# Patient Record
Sex: FEMALE | Race: BLACK OR AFRICAN AMERICAN | Employment: FULL TIME | URBAN - METROPOLITAN AREA
[De-identification: names, ages, dates, MRNs, and addresses within clinical notes are randomized per-mention and may not be internally consistent; named-entity substitution may affect disease eponyms.]

---

## 2017-12-12 ENCOUNTER — OFFICE VISIT (OUTPATIENT)
Dept: FAMILY MEDICINE | Facility: CLINIC | Age: 62
End: 2017-12-12
Payer: COMMERCIAL

## 2017-12-12 VITALS
SYSTOLIC BLOOD PRESSURE: 138 MMHG | TEMPERATURE: 97.7 F | WEIGHT: 180.8 LBS | HEART RATE: 84 BPM | DIASTOLIC BLOOD PRESSURE: 76 MMHG | OXYGEN SATURATION: 99 % | BODY MASS INDEX: 31.03 KG/M2

## 2017-12-12 DIAGNOSIS — H00.011 HORDEOLUM EXTERNUM OF RIGHT UPPER EYELID: ICD-10-CM

## 2017-12-12 DIAGNOSIS — L81.9 HYPERPIGMENTED SKIN LESION: Primary | ICD-10-CM

## 2017-12-12 PROCEDURE — 99213 OFFICE O/P EST LOW 20 MIN: CPT | Performed by: PHYSICIAN ASSISTANT

## 2017-12-12 NOTE — PROGRESS NOTES
"  SUBJECTIVE:   Shanae Brand is a 62 year old female who presents to clinic today for the following health issues:      Eye(s) Problem  Onset: This past Wed.    Description:   Location: right  Pain: no  Redness: YES    Accompanying Signs & Symptoms:  Discharge/mattering: no  Swelling: YES  Visual changes: no  Fever: no  Nasal Congestion: no  Bothered by bright lights: no    History:   Trauma: YES- Patient states she put apple cider vinegar and tea tree oil on eye lid  Foreign body exposure: no    Precipitating factors:   Wearing contacts: no    Alleviating factors:  Improved by: nothing    Therapies Tried and outcome: nothing      Patient states she has skin tags on her face and neck that she would like them taken off.   Went to dermatology once, and they tried to \"burn them\" and she left.     Problem list and histories reviewed & adjusted, as indicated.  Additional history: as documented    Patient Active Problem List   Diagnosis     Allergic rhinitis     Other specified disorders of rotator cuff syndrome of shoulder and allied disorders     Nonallopathic lesion of thoracic region     Iron deficiency anemia     CARDIOVASCULAR SCREENING; LDL GOAL LESS THAN 160     Leiomyoma of uterus     Cervical cyst     Vitamin D deficiency     Fracture of toe     Pain of toe of right foot     Skin tags     Past Surgical History:   Procedure Laterality Date     DILATION AND CURETTAGE, OPERATIVE HYSTEROSCOPY, COMBINED N/A 10/9/2014    Procedure: COMBINED DILATION AND CURETTAGE, OPERATIVE HYSTEROSCOPY;  Surgeon: Paul Martinez MD;  Location: [facility] OR     HC REMOVAL OF TONSILS,<11 Y/O  age 10       Social History   Substance Use Topics     Smoking status: Never Smoker     Smokeless tobacco: Never Used     Alcohol use No     Family History   Problem Relation Age of Onset     DIABETES Father      DM2     Alcohol/Drug Father      Family History Negative Mother      Family History Negative Sister      Family History Negative Sister      Family " History Negative Brother      Family History Negative Brother      Family History Negative Son      Breast Cancer Mother      Breast CA survivor.  Dx age 68             Reviewed and updated as needed this visit by clinical staffTobacco  Allergies  Meds  Med Hx  Surg Hx  Fam Hx  Soc Hx      Reviewed and updated as needed this visit by Provider         ROS:  Constitutional, HEENT, cardiovascular, pulmonary, gi and gu systems are negative, except as otherwise noted.      OBJECTIVE:   /76 (BP Location: Right arm, Patient Position: Chair, Cuff Size: Adult Large)  Pulse 84  Temp 97.7  F (36.5  C) (Oral)  Wt 180 lb 12.8 oz (82 kg)  SpO2 99%  BMI 31.03 kg/m2  Body mass index is 31.03 kg/(m^2).  GENERAL APPEARANCE: healthy, alert and no distress  EYES: PERRL and conjunctivae and sclerae normal, upper right lid with small hordeolum  SKIN: scattered pinpoint hyperpigmented macules under both eyes and upper cheeks    Diagnostic Test Results:  none     ASSESSMENT/PLAN:             1. Hyperpigmented skin lesion  Referred to Dr. Norton per pt preference  - DERMATOLOGY REFERRAL    2. Hordeolum externum of right upper eyelid  Warm compresses. F/u as needed   - neomycin-polymixin-dexamethasone (MAXITROL) ophthalmic ointment; Apply 1/4 inch ribbon to upper eyelid BID x 7 days  Dispense: 1 Tube; Refill: 0        Yoon Corral PA-C  [facility]

## 2017-12-12 NOTE — MR AVS SNAPSHOT
"              After Visit Summary   2017    Shanae Brand    MRN: [id number]           Patient Information     Date Of Birth          1955        Visit Information        Provider Department      2017 10:00 AM Yoon Corral PA-C [facility]        Today's Diagnoses     Visit for screening mammogram        Screening for malignant neoplasm of cervix        Need for hepatitis C screening test        Need for prophylactic vaccination and inoculation against influenza           Follow-ups after your visit        Who to contact     If you have questions or need follow up information about today's clinic visit or your schedule please contact [facility] directly at [phone].  Normal or non-critical lab and imaging results will be communicated to you by [employer], letter or phone within 4 business days after the clinic has received the results. If you do not hear from us within 7 days, please contact the clinic through [employer] or phone. If you have a critical or abnormal lab result, we will notify you by phone as soon as possible.  Submit refill requests through [employer] or call your pharmacy and they will forward the refill request to us. Please allow 3 business days for your refill to be completed.          Additional Information About Your Visit        MyChart Information     [employer] lets you send messages to your doctor, view your test results, renew your prescriptions, schedule appointments and more. To sign up, go to [email or web address] . Click on \"Log in\" on the left side of the screen, which will take you to the Welcome page. Then click on \"Sign up Now\" on the right side of the page.     You will be asked to enter the access code listed below, as well as some personal information. Please follow the directions to create your username and password.     Your access code is: [email or web address]  Expires: 3/12/2018 10:11 AM     Your access code will  in 90 days. " If you need help or a new code, please call your [place] clinic or [phone].        Care EveryWhere ID     This is your Care EveryWhere ID. This could be used by other organizations to access your [place] medical records  [id number]        Your Vitals Were     Pulse Temperature Pulse Oximetry BMI (Body Mass Index)          84 97.7  F (36.5  C) (Oral) 99% 31.03 kg/m2         Blood Pressure from Last 3 Encounters:   12/12/17 138/76   02/24/15 128/74   02/19/15 120/72    Weight from Last 3 Encounters:   12/12/17 180 lb 12.8 oz (82 kg)   02/24/15 172 lb (78 kg)   02/19/15 168 lb (76.2 kg)              Today, you had the following     No orders found for display         Today's Medication Changes          These changes are accurate as of: 12/12/17 10:11 AM.  If you have any questions, ask your nurse or doctor.               Stop taking these medicines if you haven't already. Please contact your care team if you have questions.     clobetasol 0.05 % cream   Commonly known as:  TEMOVATE   Stopped by:  Yoon Corral PA-C           order for DME   Stopped by:  Yoon Corral PA-C                    Primary Care Provider Office Phone # Fax #    Yoon Corral PA-C [phone] [address] [facility] [address]        Equal Access to Services     [facility]: Hadii jairo flor hadasho Somariamali, waaxda luqadaha, qaybta kaalmada aderosyada, elaina sánchez . So [facility] [phone].    ATENCIÓN: Si habla español, tiene a barrera disposición servicios gratuitos de asistencia lingüística. Llame al [phone].    We comply with applicable federal civil rights laws and [place] laws. We do not discriminate on the basis of race, color, national origin, age, disability, sex, sexual orientation, or gender identity.            Thank you!     Thank you for choosing [facility]  for your care. Our goal is always to provide you with excellent care. Hearing back  from our patients is one way we can continue to improve our services. Please take a few minutes to complete the written survey that you may receive in the mail after your visit with us. Thank you!             Your Updated Medication List - Protect others around you: Learn how to safely use, store and throw away your medicines at www.disposemymeds.org.      Notice  As of 12/12/2017 10:11 AM    You have not been prescribed any medications.

## 2017-12-17 ENCOUNTER — HEALTH MAINTENANCE LETTER (OUTPATIENT)
Age: 62
End: 2017-12-17

## 2018-02-28 ENCOUNTER — TELEPHONE (OUTPATIENT)
Dept: FAMILY MEDICINE | Facility: CLINIC | Age: 63
End: 2018-02-28

## 2018-02-28 NOTE — TELEPHONE ENCOUNTER
Panel Management Review      Patient has the following on her problem list: None      Composite cancer screening  Chart review shows that this patient is due/due soon for the following Pap Smear and Mammogram  Summary:    Patient is due/failing the following:   MAMMOGRAM and PAP    Action needed:   Patient needs office visit for physical and pap also schedule mammogram.    Type of outreach:    Phone, left message for patient to call back.     Questions for provider review:    None                                                                                                                                    Alaina Asif [facility]       Chart routed to Care Team .

## 2018-02-28 NOTE — LETTER
[facility]  [address] [address] [address]  [phone]  March 7, 2018    Shanae Brand  [address] [address]    Dear Shanae,    I care about your health and have reviewed your health plan. I have reviewed your medical conditions, medication list, and lab results and am making recommendations based on this review, to better manage your health.    You are in particular need of attention regarding:  -Breast Cancer Screening  -Cervical Cancer Screening    I am recommending that you:  -schedule a MAMMOGRAM which is due. We have mammogram available at our clinic; Tuesday 8:00am-11:30am or Wednesday 2:00pm-4:15pm- to schedule call [phone].   Please disregard this reminder if you have had this exam elsewhere within the last year.  It would be helpful for us to have a copy of your mammogram report in our file so that we can best coordinate your care.  -schedule a PAP SMEAR EXAM which is due.  Please disregard this reminder if you have had this exam elsewhere within the last year.  It would be helpful for us to have a copy of your recent pap smear report in our file so that we can best coordinate your care.    Here is a list of Health Maintenance topics that are due now or due soon:  Health Maintenance Due   Topic Date Due     HEPATITIS C SCREENING  02/02/1975     MAMMO SCREEN Q2 YR (SYSTEM ASSIGNED)  02/02/2007     ADVANCE DIRECTIVE PLANNING Q5 YRS  02/02/2012     PAP SCREENING Q3 YR (SYSTEM ASSIGNED)  05/02/2012   Please call us at [phone] (or use [employer]) to address the above recommendations.     Thank you for trusting [facility] and we appreciate the opportunity to serve you.  We look forward to supporting your healthcare needs in the future.    Healthy Regards,  Yoon Corral PA-C

## 2018-05-10 ENCOUNTER — TELEPHONE (OUTPATIENT)
Dept: FAMILY MEDICINE | Facility: CLINIC | Age: 63
End: 2018-05-10

## 2018-05-10 NOTE — LETTER
[facility]  [address] [place] [id number] [phone]  May 17, 2018    Shanae Brand  [address] [address]    Dear Shanae,    I care about your health and have reviewed your health plan. I have reviewed your medical conditions, medication list, and lab results and am making recommendations based on this review, to better manage your health.    You are in particular need of attention regarding:  -Colon Cancer Screening    I am recommending that you:  {recommendations:-schedule a COLONOSCOPY to look for colon cancer (due every 10 years or 5 years in higher risk situations.)   Colon cancer is now the second leading cause of death in the [place] for both men and women and there are over 130,000 new cases and 50,000 deaths per year from colon cancer.  Colonoscopies can prevent 90-95% of these deaths.  Problem lesions can be removed before they ever become cancer.  This test is not only looking for cancer, but also getting rid of precancerious lesions.  If you do not wish to do a colonoscopy or cannot afford to do one, at this time, there is another option. It is called a FIT test or Fecal Immunochemical Occult Blood Test (take home stool sample kit).  It does not replace the colonoscopy for colorectal cancer screening, but it can detect hidden bleeding in the lower colon.  It does need to be repeated every year and if a positive result is obtained, you would be referred for a colonoscopy.  If you have completed either one of these tests at another facility, please have the records sent to our clinic so that we can best coordinate your care.    Please call us at [phone] (or use [employer]) to address the above recommendations.     Thank you for trusting [facility] and we appreciate the opportunity to serve you.  We look forward to supporting your healthcare needs in the future.    Healthy Regards,    Yoon Corral PA-C

## 2018-05-10 NOTE — TELEPHONE ENCOUNTER
Panel Management Review      Patient has the following on her problem list: None      Composite cancer screening  Chart review shows that this patient is due/due soon for the following Colonoscopy and Fecal Colorectal (FIT)  Summary:    Patient is due/failing the following:   COLONOSCOPY and FIT    Action needed:   Patient needs referral/order: Colonoscopy or FIT test    Type of outreach:    Phone, left message for patient to call back.     Questions for provider review:    None                                                                                                                                    Rainer Gonzales MA       Chart routed to Care Team .

## 2018-09-19 ENCOUNTER — TELEPHONE (OUTPATIENT)
Dept: FAMILY MEDICINE | Facility: CLINIC | Age: 63
End: 2018-09-19

## 2018-09-19 NOTE — LETTER
[address] [id number] [phone]  October 2, 2018    Shanae Brand  [address] [address]    Dear Shanae,    I care about your health and have reviewed your health plan. I have reviewed your medical conditions, medication list, and lab results and am making recommendations based on this review, to better manage your health.    You are in particular need of attention regarding:  -Colon Cancer Screening    I am recommending that you:  -schedule a COLONOSCOPY to look for colon cancer (due every 10 years or 5 years in higher risk situations.)   Colon cancer is now the second leading cause of death in the [place] for both men and women and there are over 130,000 new cases and 50,000 deaths per year from colon cancer.  Colonoscopies can prevent 90-95% of these deaths.  Problem lesions can be removed before they ever become cancer.  This test is not only looking for cancer, but also getting rid of precancerious lesions.  If you do not wish to do a colonoscopy or cannot afford to do one, at this time, there is another option. It is called a FIT test or Fecal Immunochemical Occult Blood Test (take home stool sample kit).  It does not replace the colonoscopy for colorectal cancer screening, but it can detect hidden bleeding in the lower colon.  It does need to be repeated every year and if a positive result is obtained, you would be referred for a colonoscopy.  If you have completed either one of these tests at another facility, please have the records sent to our clinic so that we can best coordinate your care.        Please call us at [phone] (or use [employer]) to address the above recommendations.     Thank you for trusting [facility] and we appreciate the opportunity to serve you.  We look forward to supporting your healthcare needs in the future.    Healthy Regards,    Yoon Corral PA-C

## 2018-09-19 NOTE — TELEPHONE ENCOUNTER
Panel Management Review          Composite cancer screening  Chart review shows that this patient is due/due soon for the following Colonoscopy and Fecal Colorectal (FIT)  Summary:    Patient is due/failing the following:   COLONOSCOPY and FIT    Action needed:   Patient needs referral/order: Colonoscopy or FIT test    Type of outreach:    Panel Pool, please contact patient    Questions for provider review:    None                                                                                                                                    Rainer Gonzales MA       Chart routed to Care Team .

## 2018-11-06 ENCOUNTER — TELEPHONE (OUTPATIENT)
Dept: FAMILY MEDICINE | Facility: CLINIC | Age: 63
End: 2018-11-06

## 2018-11-06 NOTE — LETTER
[facility]  [address] [address]  [phone]  December 6, 2018    Shanae Brand  [address] [facility] [facility] [address]    Dear Shanae,    I care about your health and have reviewed your health plan. I have reviewed your medical conditions, medication list, and lab results and am making recommendations based on this review, to better manage your health.    You are in particular need of attention regarding:  -Cervical Cancer Screening    I am recommending that you:  {recommendations:-schedule a PAP SMEAR EXAM which is due.  Please disregard this reminder if you have had this exam elsewhere within the last year.  It would be helpful for us to have a copy of your recent pap smear report in our file so that we can best coordinate your care.    Here is a list of Health Maintenance topics that are due now or due soon:  Health Maintenance Due   Topic Date Due     PHQ-2 Q1 YR  02/02/1969     HIV SCREEN (SYSTEM ASSIGNED)  02/02/1975     HEPATITIS C SCREENING  02/02/1975     MAMMO SCREEN Q2 YR (SYSTEM ASSIGNED)  02/02/2007     ADVANCE DIRECTIVE PLANNING Q5 YRS  02/02/2012     PAP SCREENING Q3 YR (SYSTEM ASSIGNED)  05/02/2012     INFLUENZA VACCINE (1) 09/01/2018       Please call us at [phone] (or use [employer]) to address the above recommendations.     Thank you for trusting [facility] and we appreciate the opportunity to serve you.  We look forward to supporting your healthcare needs in the future.    Healthy Regards,    Yoon Corral PA-C/natalie

## 2018-11-06 NOTE — TELEPHONE ENCOUNTER
Panel Management Review      Patient has the following on her problem list: None      Composite cancer screening  Chart review shows that this patient is due/due soon for the following Pap Smear  Summary:    Patient is due/failing the following:   PAP    Action needed:   Patient needs office visit for PAP.    Type of outreach:    Panel Pool, please contact patient    Questions for provider review:    None                                                                                                                                    Rainer Gonzales MA       Chart routed to Care Team .

## 2018-12-28 DIAGNOSIS — M25.519 SHOULDER PAIN: Primary | ICD-10-CM

## 2018-12-28 DIAGNOSIS — M25.519 SHOULDER PAIN: ICD-10-CM

## 2018-12-28 LAB
BASOPHILS # BLD AUTO: 0 10E9/L (ref 0–0.2)
BASOPHILS NFR BLD AUTO: 0.2 %
CRP SERPL-MCNC: 28 MG/L (ref 0–8)
DIFFERENTIAL METHOD BLD: ABNORMAL
EOSINOPHIL # BLD AUTO: 0.1 10E9/L (ref 0–0.7)
EOSINOPHIL NFR BLD AUTO: 0.9 %
ERYTHROCYTE [DISTWIDTH] IN BLOOD BY AUTOMATED COUNT: 15.3 % (ref 10–15)
ERYTHROCYTE [SEDIMENTATION RATE] IN BLOOD BY WESTERGREN METHOD: 51 MM/H (ref 0–30)
HCT VFR BLD AUTO: 37 % (ref 35–47)
HGB BLD-MCNC: 11.6 G/DL (ref 11.7–15.7)
LDH SERPL L TO P-CCNC: 119 U/L (ref 81–234)
LYMPHOCYTES # BLD AUTO: 1.8 10E9/L (ref 0.8–5.3)
LYMPHOCYTES NFR BLD AUTO: 31.4 %
MCH RBC QN AUTO: 24.4 PG (ref 26.5–33)
MCHC RBC AUTO-ENTMCNC: 31.4 G/DL (ref 31.5–36.5)
MCV RBC AUTO: 78 FL (ref 78–100)
MONOCYTES # BLD AUTO: 0.2 10E9/L (ref 0–1.3)
MONOCYTES NFR BLD AUTO: 4.1 %
NEUTROPHILS # BLD AUTO: 3.6 10E9/L (ref 1.6–8.3)
NEUTROPHILS NFR BLD AUTO: 63.4 %
PLATELET # BLD AUTO: 330 10E9/L (ref 150–450)
RBC # BLD AUTO: 4.76 10E12/L (ref 3.8–5.2)
WBC # BLD AUTO: 5.6 10E9/L (ref 4–11)

## 2018-12-28 PROCEDURE — 83615 LACTATE (LD) (LDH) ENZYME: CPT | Performed by: FAMILY MEDICINE

## 2018-12-28 PROCEDURE — 85652 RBC SED RATE AUTOMATED: CPT | Performed by: FAMILY MEDICINE

## 2018-12-28 PROCEDURE — 85025 COMPLETE CBC W/AUTO DIFF WBC: CPT | Performed by: FAMILY MEDICINE

## 2018-12-28 PROCEDURE — 86038 ANTINUCLEAR ANTIBODIES: CPT | Performed by: FAMILY MEDICINE

## 2018-12-28 PROCEDURE — 86431 RHEUMATOID FACTOR QUANT: CPT | Performed by: FAMILY MEDICINE

## 2018-12-28 PROCEDURE — 82550 ASSAY OF CK (CPK): CPT | Performed by: FAMILY MEDICINE

## 2018-12-28 PROCEDURE — 86140 C-REACTIVE PROTEIN: CPT | Performed by: FAMILY MEDICINE

## 2018-12-28 PROCEDURE — 80053 COMPREHEN METABOLIC PANEL: CPT | Performed by: FAMILY MEDICINE

## 2018-12-28 PROCEDURE — 36415 COLL VENOUS BLD VENIPUNCTURE: CPT | Performed by: FAMILY MEDICINE

## 2018-12-29 LAB
ALBUMIN SERPL-MCNC: 4 G/DL (ref 3.4–5)
ALP SERPL-CCNC: 59 U/L (ref 40–150)
ALT SERPL W P-5'-P-CCNC: 12 U/L (ref 0–50)
ANION GAP SERPL CALCULATED.3IONS-SCNC: 10 MMOL/L (ref 3–14)
AST SERPL W P-5'-P-CCNC: 15 U/L (ref 0–45)
BILIRUB SERPL-MCNC: 0.5 MG/DL (ref 0.2–1.3)
BUN SERPL-MCNC: 8 MG/DL (ref 7–30)
CALCIUM SERPL-MCNC: 9.9 MG/DL (ref 8.5–10.1)
CHLORIDE SERPL-SCNC: 102 MMOL/L (ref 94–109)
CK SERPL-CCNC: 61 U/L (ref 30–225)
CO2 SERPL-SCNC: 25 MMOL/L (ref 20–32)
CREAT SERPL-MCNC: 0.79 MG/DL (ref 0.52–1.04)
GFR SERPL CREATININE-BSD FRML MDRD: 81 ML/MIN/{1.73_M2}
GLUCOSE SERPL-MCNC: 90 MG/DL (ref 70–99)
POTASSIUM SERPL-SCNC: 3.7 MMOL/L (ref 3.4–5.3)
PROT SERPL-MCNC: 8.5 G/DL (ref 6.8–8.8)
SODIUM SERPL-SCNC: 137 MMOL/L (ref 133–144)

## 2018-12-31 LAB
ANA SER QL IF: NEGATIVE
RHEUMATOID FACT SER NEPH-ACNC: <20 IU/ML (ref 0–20)

## 2019-06-18 ENCOUNTER — OFFICE VISIT (OUTPATIENT)
Dept: FAMILY MEDICINE | Facility: CLINIC | Age: 64
End: 2019-06-18
Payer: COMMERCIAL

## 2019-06-18 VITALS
WEIGHT: 127 LBS | BODY MASS INDEX: 21.8 KG/M2 | SYSTOLIC BLOOD PRESSURE: 122 MMHG | HEART RATE: 74 BPM | TEMPERATURE: 97.5 F | OXYGEN SATURATION: 100 % | DIASTOLIC BLOOD PRESSURE: 72 MMHG

## 2019-06-18 DIAGNOSIS — E55.9 VITAMIN D DEFICIENCY: ICD-10-CM

## 2019-06-18 DIAGNOSIS — Z13.6 CARDIOVASCULAR SCREENING; LDL GOAL LESS THAN 160: ICD-10-CM

## 2019-06-18 DIAGNOSIS — M62.81 GENERALIZED MUSCLE WEAKNESS: ICD-10-CM

## 2019-06-18 DIAGNOSIS — D50.9 IRON DEFICIENCY ANEMIA, UNSPECIFIED IRON DEFICIENCY ANEMIA TYPE: ICD-10-CM

## 2019-06-18 DIAGNOSIS — R53.82 CHRONIC FATIGUE: Primary | ICD-10-CM

## 2019-06-18 PROCEDURE — 99214 OFFICE O/P EST MOD 30 MIN: CPT | Performed by: PHYSICIAN ASSISTANT

## 2019-06-18 NOTE — PROGRESS NOTES
Subjective     Shanae Brand is a 64 year old female who presents to clinic today for the following health issues:    HPI   Thinks she has Polymyositis x 8 months  Bed ridden 90% of the time since Oct 2018.     Patient was seen by [facility] for shoulder/arm weakness.  Labs showed elevated CRP and ESR at that time, was referred to Rheumatology, but never followed up on this.    PATIENT now has upcoming appt with Rheum in 7 days in [place].    Patient with multiple symptoms.   General muscle weakness, severe fatigue, depressed mood (per ), myalgia, arthralgia, weight loss due to decreased appetite and decreased intake.    Patient very hesitant to seek care.    Patient requesting blood type testing and cortisol level.          Patient Active Problem List   Diagnosis     Allergic rhinitis     Other specified disorders of rotator cuff syndrome of shoulder and allied disorders     Nonallopathic lesion of thoracic region     Iron deficiency anemia     CARDIOVASCULAR SCREENING; LDL GOAL LESS THAN 160     Leiomyoma of uterus     Cervical cyst     Vitamin D deficiency     Fracture of toe     Pain of toe of right foot     Skin tags     Past Surgical History:   Procedure Laterality Date     DILATION AND CURETTAGE, OPERATIVE HYSTEROSCOPY, COMBINED N/A 10/9/2014    Procedure: COMBINED DILATION AND CURETTAGE, OPERATIVE HYSTEROSCOPY;  Surgeon: Paul Martinez MD;  Location: [facility] OR     HC REMOVAL OF TONSILS,<11 Y/O  age 10       Social History     Tobacco Use     Smoking status: Never Smoker     Smokeless tobacco: Never Used   Substance Use Topics     Alcohol use: No     Family History   Problem Relation Age of Onset     Diabetes Father         DM2     Alcohol/Drug Father      Family History Negative Mother      Breast Cancer Mother         Breast CA survivor.  Dx age 68     Family History Negative Sister      Family History Negative Sister      Family History Negative Brother      Family History Negative Brother      Family History  Negative Son            Reviewed and updated as needed this visit by Provider         Review of Systems   ROS COMP: Constitutional, HEENT, cardiovascular, pulmonary, GI, , musculoskeletal, neuro, skin, endocrine and psych systems are negative, except as otherwise noted.      Objective    There were no vitals taken for this visit.  There is no height or weight on file to calculate BMI.  Physical Exam   GENERAL APPEARANCE: alert and no distress  RESP: lungs clear to auscultation - no rales, rhonchi or wheezes  CV: regular rates and rhythm, normal S1 S2, no S3 or S4 and no murmur, click or rub  SKIN: no suspicious lesions or rashes  NEURO: Normal strength and tone, mentation intact and speech normal  PSYCH: mentation appears normal and anxious            Assessment & Plan     1. Chronic fatigue  Await labs. F/u with Rheumatology   - ABO and Rh; Future  - Hemoglobin A1c; Future  - TSH; Future  - T4, free; Future  - Cortisol; Future  - Magnesium; Future  - Thyroid peroxidase antibody; Future  - Anti thyroglobulin antibody; Future  - CBC with platelets; Future  - Ferritin; Future  - Vitamin D Deficiency; Future    2. Vitamin D deficiency    - Vitamin D Deficiency; Future    3. Iron deficiency anemia, unspecified iron deficiency anemia type  Await labs.   - ABO and Rh; Future  - CBC with platelets; Future  - Ferritin; Future  - Vitamin D Deficiency; Future    4. CARDIOVASCULAR SCREENING; LDL GOAL LESS THAN 160    - Lipid panel reflex to direct LDL Fasting; Future    5. Generalized muscle weakness  F/u with Rheum.   - ABO and Rh; Future  - Hemoglobin A1c; Future  - TSH; Future  - T4, free; Future  - Magnesium; Future  - Thyroid peroxidase antibody; Future  - Anti thyroglobulin antibody; Future  - CBC with platelets; Future     Patient denies symptoms being mood related. She did not want any further work up then these agreed upon labs. Will have others checked through Rheumatology       No follow-ups on file.    Yoon  KRYSTAL Corral PA-C  [facility]

## 2019-06-21 DIAGNOSIS — D50.9 IRON DEFICIENCY ANEMIA, UNSPECIFIED IRON DEFICIENCY ANEMIA TYPE: ICD-10-CM

## 2019-06-21 DIAGNOSIS — R53.82 CHRONIC FATIGUE: ICD-10-CM

## 2019-06-21 DIAGNOSIS — E55.9 VITAMIN D DEFICIENCY: ICD-10-CM

## 2019-06-21 DIAGNOSIS — Z13.6 CARDIOVASCULAR SCREENING; LDL GOAL LESS THAN 160: ICD-10-CM

## 2019-06-21 DIAGNOSIS — M62.81 GENERALIZED MUSCLE WEAKNESS: ICD-10-CM

## 2019-06-21 PROBLEM — Z67.10 TYPE A BLOOD, RH POSITIVE: Status: ACTIVE | Noted: 2019-06-21

## 2019-06-21 LAB
ABO + RH BLD: NORMAL
ABO + RH BLD: NORMAL
CHOLEST SERPL-MCNC: 138 MG/DL
CORTIS SERPL-MCNC: 9.2 UG/DL (ref 4–22)
DEPRECATED CALCIDIOL+CALCIFEROL SERPL-MC: 75 UG/L (ref 20–75)
ERYTHROCYTE [DISTWIDTH] IN BLOOD BY AUTOMATED COUNT: 18 % (ref 10–15)
FERRITIN SERPL-MCNC: 162 NG/ML (ref 8–252)
HBA1C MFR BLD: 5.9 % (ref 0–5.6)
HCT VFR BLD AUTO: 31.2 % (ref 35–47)
HDLC SERPL-MCNC: 53 MG/DL
HGB BLD-MCNC: 9.7 G/DL (ref 11.7–15.7)
LDLC SERPL CALC-MCNC: 72 MG/DL
MAGNESIUM SERPL-MCNC: 2.3 MG/DL (ref 1.6–2.3)
MCH RBC QN AUTO: 22.6 PG (ref 26.5–33)
MCHC RBC AUTO-ENTMCNC: 31.1 G/DL (ref 31.5–36.5)
MCV RBC AUTO: 73 FL (ref 78–100)
NONHDLC SERPL-MCNC: 85 MG/DL
PLATELET # BLD AUTO: 451 10E9/L (ref 150–450)
RBC # BLD AUTO: 4.29 10E12/L (ref 3.8–5.2)
SPECIMEN EXP DATE BLD: NORMAL
T4 FREE SERPL-MCNC: 1.22 NG/DL (ref 0.76–1.46)
TRIGL SERPL-MCNC: 67 MG/DL
TSH SERPL DL<=0.005 MIU/L-ACNC: 1.6 MU/L (ref 0.4–4)
WBC # BLD AUTO: 7.7 10E9/L (ref 4–11)

## 2019-06-21 PROCEDURE — 85027 COMPLETE CBC AUTOMATED: CPT | Performed by: PHYSICIAN ASSISTANT

## 2019-06-21 PROCEDURE — 36415 COLL VENOUS BLD VENIPUNCTURE: CPT | Performed by: PHYSICIAN ASSISTANT

## 2019-06-21 PROCEDURE — 82306 VITAMIN D 25 HYDROXY: CPT | Performed by: PHYSICIAN ASSISTANT

## 2019-06-21 PROCEDURE — 86901 BLOOD TYPING SEROLOGIC RH(D): CPT | Performed by: PHYSICIAN ASSISTANT

## 2019-06-21 PROCEDURE — 86376 MICROSOMAL ANTIBODY EACH: CPT | Performed by: PHYSICIAN ASSISTANT

## 2019-06-21 PROCEDURE — 82728 ASSAY OF FERRITIN: CPT | Performed by: PHYSICIAN ASSISTANT

## 2019-06-21 PROCEDURE — 84439 ASSAY OF FREE THYROXINE: CPT | Performed by: PHYSICIAN ASSISTANT

## 2019-06-21 PROCEDURE — 86900 BLOOD TYPING SEROLOGIC ABO: CPT | Performed by: PHYSICIAN ASSISTANT

## 2019-06-21 PROCEDURE — 84443 ASSAY THYROID STIM HORMONE: CPT | Performed by: PHYSICIAN ASSISTANT

## 2019-06-21 PROCEDURE — 82533 TOTAL CORTISOL: CPT | Performed by: PHYSICIAN ASSISTANT

## 2019-06-21 PROCEDURE — 86800 THYROGLOBULIN ANTIBODY: CPT | Performed by: PHYSICIAN ASSISTANT

## 2019-06-21 PROCEDURE — 83036 HEMOGLOBIN GLYCOSYLATED A1C: CPT | Performed by: PHYSICIAN ASSISTANT

## 2019-06-21 PROCEDURE — 83735 ASSAY OF MAGNESIUM: CPT | Performed by: PHYSICIAN ASSISTANT

## 2019-06-21 PROCEDURE — 80061 LIPID PANEL: CPT | Performed by: PHYSICIAN ASSISTANT

## 2019-06-25 ENCOUNTER — TELEPHONE (OUTPATIENT)
Dept: FAMILY MEDICINE | Facility: CLINIC | Age: 64
End: 2019-06-25

## 2019-06-25 LAB
THYROGLOB AB SERPL IA-ACNC: <20 IU/ML (ref 0–40)
THYROPEROXIDASE AB SERPL-ACNC: 22 IU/ML

## 2019-06-25 NOTE — TELEPHONE ENCOUNTER
Called patient to relay message below: No answer left Vm to return call      Notes recorded by Yoon Corral PA-C on 6/25/2019 at 9:32 AM CDT  Please call pt.    Shanae,    Your labs show your blood type is A+.  Your vitamin D, tmagnesium, cholesterol, thyroid, cortisol and iron storage is normal.      Your complete blood count shows are you anemic. Your hemoglobin in low at 9.7.    I recommend you start iron supplementation daily. You can purchase iron over the counter.    I recommend a follow up with me to recheck this in the office in 4 weeks.   ------

## 2019-06-26 ENCOUNTER — TRANSFERRED RECORDS (OUTPATIENT)
Dept: HEALTH INFORMATION MANAGEMENT | Facility: CLINIC | Age: 64
End: 2019-06-26

## 2019-06-26 NOTE — TELEPHONE ENCOUNTER
L/M to call.  Letter was sent per below.  Will leave to see if calls back.  Letitia Mcgill RN          Notes recorded by Jaida Alberts MA on 6/25/2019 at 3:02 PM CDT  Letter and results have been mailed to patient.  Jaida Alberts    ------    Notes recorded by Schoenberger, Shari J, CMA on 6/25/2019 at 3:00 PM CDT  Letter sent with results.  Shari Schoenberger, CMA      ------    Notes recorded by Yoon Corral PA-C on 6/25/2019 at 2:40 PM CDT  Shanae,    Your labs show your blood type is A+.  Your vitamin D, tmagnesium, cholesterol, thyroid, cortisol and iron storage is normal.    Your complete blood count shows are you anemic. Your hemoglobin in low at 9.7.    I recommend you start iron supplementation daily. You can purchase iron over the counter.    I recommend a follow up with me to recheck this in the office in 4 weeks.     Take Care,   Yoon Corral PA-C    ------    Notes recorded by Jennifer Lizarraga RN on 6/25/2019 at 11:10 AM CDT  Called patient- No answer, created Telephone Encounter.   Jennifer NAM RN    ------    Notes recorded by Yoon Corral PA-C on 6/25/2019 at 9:32 AM CDT  Please call pt.    Shanae,    Your labs show your blood type is A+.  Your vitamin D, magnesium, cholesterol, thyroid, cortisol and iron storage is normal.  We are still waiting on the thyroid antibodies to come back.    Your complete blood count shows are you anemic. Your hemoglobin in low at 9.7.  I will discuss this more, once I get the thyroid antibodies back.  ------    Notes recorded by Yoon Corral PA-C on 6/21/2019 at 3:54 PM CDT  Please call pt.    Shanae,    Your labs show your blood type is A+.  Your magnesium, cholesterol, thyroid, cortisol and iron storage is normal.  We are still waiting on the thyroid antibodies and vitamin D levels to come back.    Your complete blood count shows are you anemic. Your hemoglobin in low at 9.7.

## 2019-07-24 ENCOUNTER — TRANSFERRED RECORDS (OUTPATIENT)
Dept: HEALTH INFORMATION MANAGEMENT | Facility: CLINIC | Age: 64
End: 2019-07-24

## 2019-07-29 ENCOUNTER — TELEPHONE (OUTPATIENT)
Dept: FAMILY MEDICINE | Facility: CLINIC | Age: 64
End: 2019-07-29

## 2019-07-29 NOTE — TELEPHONE ENCOUNTER
"Pt calling asking for order to have her colonoscopy done this year.     She has been seeing rheumatology for \"inflammation all over\" she has started prednisone and is feeling better, able to move and go to the gym. Pt is asking for an order for a stool sample to check for parasites. She states she was told that a parasite could be why she is having pain in her lower intestines. She wasn't sure if PCP could help with her intestine pain or of she needed a GI consult.     Please review and advise. Call pt at [phone]     Jose Armando Meraz   07/29/19 12:54 PM     "

## 2019-07-30 NOTE — TELEPHONE ENCOUNTER
Message left for patient to return call to clinic and ask to speak to available triage nurse     - Request for parasite testing will require visit   - This writer unclear when last colonoscopy was by chart review - HM shows 2024   - GI referral can be sent to provider for review if patient requesting after assessment     Stefani Simon, Registered Nurse   [facility]

## 2019-07-31 NOTE — TELEPHONE ENCOUNTER
"Patient calling back.  Discussed below message.  Tried to clarify parasite testing request.  States \"is anemic and has no business being anemic\".  Feels may have a parasite causing her issues.   does not really want to do colonoscopy but her  wants her to.  Worried about complications like puncture.  States prefers to do parasite testing before she does colonoscopy.  Scheduled telephone visit for 8/8/19 10:25 am to discuss.  Letitia Mcgill RN        "

## 2019-08-08 ENCOUNTER — VIRTUAL VISIT (OUTPATIENT)
Dept: FAMILY MEDICINE | Facility: CLINIC | Age: 64
End: 2019-08-08
Payer: COMMERCIAL

## 2019-08-08 DIAGNOSIS — R19.7 DIARRHEA, UNSPECIFIED TYPE: Primary | ICD-10-CM

## 2019-08-08 DIAGNOSIS — M35.3 POLYMYALGIA RHEUMATICA (H): ICD-10-CM

## 2019-08-08 DIAGNOSIS — D50.9 IRON DEFICIENCY ANEMIA, UNSPECIFIED IRON DEFICIENCY ANEMIA TYPE: ICD-10-CM

## 2019-08-08 PROCEDURE — 99441 ZZC PHYSICIAN TELEPHONE EVALUATION 5-10 MIN: CPT | Performed by: PHYSICIAN ASSISTANT

## 2019-08-08 NOTE — PROGRESS NOTES
"Shanae Brand is a 64 year old female who is being evaluated via a billable telephone visit.      The patient has been notified of following:     \"This telephone visit will be conducted via a call between you and your physician/provider. We have found that certain health care needs can be provided without the need for a physical exam.  This service lets us provide the care you need with a short phone conversation.  If a prescription is necessary we can send it directly to your pharmacy.  If lab work is needed we can place an order for that and you can then stop by our lab to have the test done at a later time.    If during the course of the call the physician/provider feels a telephone visit is not appropriate, you will not be charged for this service.\"     Consent has been obtained for this service by 1 care team member: yes. See the scanned image in the medical record.    Shanae Brand complains of  No chief complaint on file.      I have reviewed and updated the patient's Past Medical History, Social History, Family History and Medication List.    ALLERGIES  Other [seasonal allergies] and Sulfur    Oksana HAMMOND   (MA signature)    Additional provider notes: has f/u with Rheumatology for PMR.  Needs colonoscopy, which she had previously declined given her anemia.  Patient feels her symptoms may be all related to a \"parasite infection given what I have read on the internet.\"      Assessment/Plan:  (R19.7) Diarrhea, unspecified type  (primary encounter diagnosis)  Comment: will get stool studies per pt request.  colonoscopy ordered as well.   Plan: Ova and Parasite Exam Routine, Enteric Bacteria        and Virus Panel by JEANETH Stool            (M35.3) Polymyalgia rheumatica (H)  Comment:   Plan: f/u with Rheum    I have reviewed the note as documented above.  This accurately captures the substance of my conversation with the patient, Shanae Brand      Total time of call between patient and provider was 7 minutes "     Yoon Corral PA-C

## 2019-08-12 DIAGNOSIS — R19.7 DIARRHEA, UNSPECIFIED TYPE: ICD-10-CM

## 2019-08-12 PROCEDURE — 87209 SMEAR COMPLEX STAIN: CPT | Performed by: PHYSICIAN ASSISTANT

## 2019-08-12 PROCEDURE — 87506 IADNA-DNA/RNA PROBE TQ 6-11: CPT | Performed by: PHYSICIAN ASSISTANT

## 2019-08-12 PROCEDURE — 87177 OVA AND PARASITES SMEARS: CPT | Performed by: PHYSICIAN ASSISTANT

## 2019-08-13 LAB
C COLI+JEJUNI+LARI FUSA STL QL NAA+PROBE: NOT DETECTED
EC STX1 GENE STL QL NAA+PROBE: NOT DETECTED
EC STX2 GENE STL QL NAA+PROBE: NOT DETECTED
ENTERIC PATHOGEN COMMENT: NORMAL
NOROV GI+II ORF1-ORF2 JNC STL QL NAA+PR: NOT DETECTED
O+P STL MICRO: NORMAL
O+P STL MICRO: NORMAL
RVA NSP5 STL QL NAA+PROBE: NOT DETECTED
SALMONELLA SP RPOD STL QL NAA+PROBE: NOT DETECTED
SHIGELLA SP+EIEC IPAH STL QL NAA+PROBE: NOT DETECTED
SPECIMEN SOURCE: NORMAL
V CHOL+PARA RFBL+TRKH+TNAA STL QL NAA+PR: NOT DETECTED
Y ENTERO RECN STL QL NAA+PROBE: NOT DETECTED

## 2019-08-14 ENCOUNTER — TELEPHONE (OUTPATIENT)
Dept: FAMILY MEDICINE | Facility: CLINIC | Age: 64
End: 2019-08-14

## 2019-08-14 NOTE — LETTER
August 17, 2019      Shanae Brand  [address] [address]    Here is a message from Yoon Corral. We tried to reach you by phone.     Shanae,    Your stool studies did not show any parasites or bacteria. Please proceed with the colonoscopy.    Yoon Corral PA-C    Sincerely,    Yoon Corral PA-C/Shoshana BEVERLY

## 2019-08-14 NOTE — TELEPHONE ENCOUNTER
Message left for patient to return call to clinic and ask to speak to available triage nurse     Yoon Corral, ANDRIA  P Cr Triage             Please call pt.     Shanae,     Your stool studies did not show any parasites or bacteria. Please proceed with the colonoscopy.     ANDRIA Yates, Registered Nurse   [facility]

## 2019-08-17 NOTE — TELEPHONE ENCOUNTER
Patient Contact    Attempt # 2    Was call answered?  No.  Letter sent   Shoshana Pascual RN, BSN  Message handled by Nurse Triage.

## 2019-10-23 ENCOUNTER — TRANSFERRED RECORDS (OUTPATIENT)
Dept: HEALTH INFORMATION MANAGEMENT | Facility: CLINIC | Age: 64
End: 2019-10-23

## 2019-10-23 ENCOUNTER — TELEPHONE (OUTPATIENT)
Dept: FAMILY MEDICINE | Facility: CLINIC | Age: 64
End: 2019-10-23

## 2019-10-23 DIAGNOSIS — M35.3 POLYMYALGIA RHEUMATICA (H): Primary | ICD-10-CM

## 2019-10-23 NOTE — TELEPHONE ENCOUNTER
Patient called in requesting a referral for physical therapy. Attempted to get details what she needs PT for but would not discuss. She would like referral to go to [facility] in [place]. She also had some questions on how to obtain a handicap plate or sticker. Please call patient back at [phone]       Rachel Ortega-Patient Rep

## 2019-10-24 NOTE — TELEPHONE ENCOUNTER
"Spoke with pt:     She states she spoke with Dr. Caal about this last year, and \"should have asked for a referral them\"     Looking for PT for strength and conditioning r/t polymyalgia rheumatica at [facility] in [place]    Denies any new or acute conditions     Eli, are you willing to pend referral for PCP review?     Thank you!   Jennifer NAM RN          "

## 2019-10-25 NOTE — TELEPHONE ENCOUNTER
10/25/2019    Patient called to check on the status of this issue. Please review and advise patient at ph:[phone]    Jojo Crawford -Patient Representative

## 2019-10-28 NOTE — TELEPHONE ENCOUNTER
Sent in new referral to [facility]. Faxed.     Rosa Modi RN -- [facility]

## 2019-10-28 NOTE — TELEPHONE ENCOUNTER
Pt called back in and said she doesn't want to go to [place] for this. She would like to go where she went in the past; [facility] in [place]. She said that she called her insurance and they should be covered. She is asking for a new referral to be placed.    Rupali Jacobsen Patient Representative

## 2019-12-05 ENCOUNTER — TELEPHONE (OUTPATIENT)
Dept: FAMILY MEDICINE | Facility: CLINIC | Age: 64
End: 2019-12-05

## 2019-12-05 NOTE — LETTER
[facility]  [address] [address]  [phone]  December 5, 2019    Shanae Brand  [address] [address]    Dear Shanae,    I care about your health and have reviewed your health plan. I have reviewed your medical conditions, medication list, and lab results and am making recommendations based on this review, to better manage your health.    You are in particular need of attention regarding:  -Breast Cancer Screening  -Colon Cancer Screening  -Cervical Cancer Screening    I am recommending that you:  {recommendations:-schedule a MAMMOGRAM which is due. We have mammogram available at our clinic; Tuesday 8:00am-11:30am or Wednesday 2:00pm-4:15pm- to schedule call [phone].   Please disregard this reminder if you have had this exam elsewhere within the last year.  It would be helpful for us to have a copy of your mammogram report in our file so that we can best coordinate your care.  -schedule a COLONOSCOPY to look for colon cancer (due every 10 years or 5 years in higher risk situations.)   Colon cancer is now the second leading cause of death in the [place] for both men and women and there are over 130,000 new cases and 50,000 deaths per year from colon cancer.  Colonoscopies can prevent 90-95% of these deaths.  Problem lesions can be removed before they ever become cancer.  This test is not only looking for cancer, but also getting rid of precancerious lesions.  If you do not wish to do a colonoscopy or cannot afford to do one, at this time, there is another option. It is called a FIT test or Fecal Immunochemical Occult Blood Test (take home stool sample kit).  It does not replace the colonoscopy for colorectal cancer screening, but it can detect hidden bleeding in the lower colon.  It does need to be repeated every year and if a positive result is obtained, you would be referred for a colonoscopy.  If you have completed  either one of these tests at another facility, please have the records sent to our clinic so that we can best coordinate your care.  -schedule a PAP SMEAR EXAM which is due.  Please disregard this reminder if you have had this exam elsewhere within the last year.  It would be helpful for us to have a copy of your recent pap smear report in our file so that we can best coordinate your care.    Here is a list of Health Maintenance topics that are due now or due soon:  Health Maintenance Due   Topic Date Due     HEPATITIS C SCREENING  1955     ANNUAL REVIEW OF HM ORDERS  1955     ADVANCE CARE PLANNING  1955     MAMMO SCREENING  1955     HIV SCREENING  02/02/1972     HPV  02/02/1978     ZOSTER IMMUNIZATION (1 of 2) 02/02/2007     PAP  05/02/2014     PREVENTIVE CARE VISIT  08/08/2015     PHQ-2  01/01/2019     INFLUENZA VACCINE (1) 09/01/2019       Please call us at [phone] (or use [facility]) to address the above recommendations.     Thank you for trusting [facility] and we appreciate the opportunity to serve you.  We look forward to supporting your healthcare needs in the future.    Healthy Regards,    [facility]/

## 2019-12-05 NOTE — TELEPHONE ENCOUNTER
Summary:    Patient is due/failing the following:   PAP    Reviewed:  [x] CARE EVERYWHERE  [x] LAST OV NOTE INCLUDING ENDO  [x] FYI TAB  [x] MYCHART ACTIVE?  [x] LAST PANEL ENCOUNTER  [x] FUTURE APPTS  [x] IMMUNIZATIONS          Action needed:   Patient needs office visit for pap.    Type of outreach:    Phone, left message for patient to call back.  and Sent letter.                                                                               Sofy Gong/[facility]---[facility]